# Patient Record
Sex: MALE | URBAN - METROPOLITAN AREA
[De-identification: names, ages, dates, MRNs, and addresses within clinical notes are randomized per-mention and may not be internally consistent; named-entity substitution may affect disease eponyms.]

---

## 2022-08-16 ENCOUNTER — OFFICE (OUTPATIENT)
Dept: URBAN - METROPOLITAN AREA CLINIC 70 | Facility: CLINIC | Age: 37
Setting detail: OPHTHALMOLOGY
End: 2022-08-16
Payer: COMMERCIAL

## 2022-08-16 ENCOUNTER — RX ONLY (RX ONLY)
Age: 37
End: 2022-08-16

## 2022-08-16 DIAGNOSIS — H16.223: ICD-10-CM

## 2022-08-16 PROBLEM — H43.813 POSTERIOR VITREOUS DETACHMENT; BOTH EYES: Status: ACTIVE | Noted: 2022-08-16

## 2022-08-16 PROCEDURE — 92004 COMPRE OPH EXAM NEW PT 1/>: CPT | Performed by: OPHTHALMOLOGY

## 2022-08-16 ASSESSMENT — REFRACTION_CURRENTRX
OS_CYLINDER: -2.00
OS_AXIS: 005
OD_SPHERE: -0.25
OS_OVR_VA: 20/
OD_OVR_VA: 20/
OD_CYLINDER: -1.25
OS_SPHERE: -0.25
OD_AXIS: 010

## 2022-08-16 ASSESSMENT — REFRACTION_AUTOREFRACTION
OD_CYLINDER: -2.50
OS_CYLINDER: -3.75
OD_AXIS: 003
OS_AXIS: 177
OD_SPHERE: -0.25
OS_SPHERE: PL

## 2022-08-16 ASSESSMENT — AXIALLENGTH_DERIVED: OD_AL: 23.9522

## 2022-08-16 ASSESSMENT — VISUAL ACUITY
OD_BCVA: 20/20-2
OS_BCVA: 20/20-2

## 2022-08-16 ASSESSMENT — CONFRONTATIONAL VISUAL FIELD TEST (CVF)
OD_FINDINGS: FULL
OS_FINDINGS: FULL

## 2022-08-16 ASSESSMENT — KERATOMETRY
OD_K1POWER_DIOPTERS: 42.75
OS_AXISANGLE_DEGREES: 088
OS_K2POWER_DIOPTERS: 46.25
OD_AXISANGLE_DEGREES: 092
OD_K2POWER_DIOPTERS: 45.50
OS_K1POWER_DIOPTERS: 42.75

## 2022-08-16 ASSESSMENT — SUPERFICIAL PUNCTATE KERATITIS (SPK)
OD_SPK: 2+
OS_SPK: 2+

## 2022-08-16 ASSESSMENT — SPHEQUIV_DERIVED: OD_SPHEQUIV: -1.5

## 2024-09-23 ENCOUNTER — NON-APPOINTMENT (OUTPATIENT)
Age: 39
End: 2024-09-23

## 2024-09-24 ENCOUNTER — NON-APPOINTMENT (OUTPATIENT)
Age: 39
End: 2024-09-24

## 2024-09-24 PROBLEM — Z00.00 ENCOUNTER FOR PREVENTIVE HEALTH EXAMINATION: Status: ACTIVE | Noted: 2024-09-24

## 2024-09-25 ENCOUNTER — APPOINTMENT (OUTPATIENT)
Dept: ORTHOPEDIC SURGERY | Facility: CLINIC | Age: 39
End: 2024-09-25
Payer: COMMERCIAL

## 2024-09-25 ENCOUNTER — NON-APPOINTMENT (OUTPATIENT)
Age: 39
End: 2024-09-25

## 2024-09-25 VITALS — BODY MASS INDEX: 23.1 KG/M2 | WEIGHT: 180 LBS | HEIGHT: 74 IN

## 2024-09-25 DIAGNOSIS — S89.92XA UNSPECIFIED INJURY OF LEFT LOWER LEG, INITIAL ENCOUNTER: ICD-10-CM

## 2024-09-25 PROCEDURE — 99203 OFFICE O/P NEW LOW 30 MIN: CPT

## 2024-09-25 NOTE — DISCUSSION/SUMMARY
[de-identified] : Longstanding left knee pain and mechanical symptoms after an injury while paddle boarding.  He is joint line pain mechanical symptoms and a period of rest anti-inflammatory medications physical therapy exercises greater than 6 weeks with continued symptoms.  While paddle boarding.  He was joint line pain mechanical symptoms in the period of rest anti-inflammatory medications physical therapy exercises greater than 6 weeks with continued symptoms.  Will obtain MRI to evaluate his medial meniscus he will follow-up after MRI.  All questions answered

## 2024-09-25 NOTE — PHYSICAL EXAM
[de-identified] : General Exam  Well developed, well nourished  No apparent distress  Oriented to person, place, and time  Mood: Normal  Affect: Normal  Balance and coordination: Normal  Gait: Normal  left knee exam    Skin: Clean, dry, intact Inspection: No obvious malalignment, no masses, no swelling, no effusion.  Tenderness: + MJLT. No LJLT. No tenderness over the medial and lateral patella facets. No ttp medial/lateral epicondyle, patella tendon, tibial tubercle, pes anserinus, or proximal fibula.  ROM: 0 to 140  pain with deep flexion  Stability: Stable to varus, valgus, lachman testing. Negative anterior/posterior drawer.  Additional tests: +McMurrays test, Negative patellar grind test.   Strength: 5/5 Q/H/TA/GS/EHL, no atrophy  Neuro: In tact to light touch throughout in dp/sp/tib/karly/saph nerve districutions,  DTR's normal Pulses: 2+ DP/PT pulses.  [de-identified] : Radiographs obtained outside reviewed.  Joint spaces are well-maintained there is normal alignment there is no fracture

## 2024-09-25 NOTE — HISTORY OF PRESENT ILLNESS
[de-identified] : 38yo male presents complaining of bilateral knee pain left worse than right.  He states he sustained an injury over the summer jumping off a paddle board he did not realize/all the water was fell back.  1 to 2 days later he starts develop left medial knee pain.  Pain is localized to medial aspect.  He reports clicking intermittently.  No swelling.  He has been doing home PT exercises and stretches over the last 6 weeks without any relief.  Is here today for further orthopedic knee consultation.  Denies numbness tingling The patient's past medical history, past surgical history, medications, allergies, and social history were reviewed by me today with the patient and documented accordingly. In addition, the patient's family history, which is noncontributory to this visit, was also reviewed.

## 2024-10-09 ENCOUNTER — NON-APPOINTMENT (OUTPATIENT)
Age: 39
End: 2024-10-09